# Patient Record
Sex: FEMALE | Race: WHITE | URBAN - METROPOLITAN AREA
[De-identification: names, ages, dates, MRNs, and addresses within clinical notes are randomized per-mention and may not be internally consistent; named-entity substitution may affect disease eponyms.]

---

## 2018-12-24 ENCOUNTER — HOSPITAL ENCOUNTER (OUTPATIENT)
Age: 38
Discharge: HOME | End: 2018-12-24
Payer: COMMERCIAL

## 2018-12-24 VITALS
TEMPERATURE: 98 F | RESPIRATION RATE: 14 BRPM | HEART RATE: 82 BPM | DIASTOLIC BLOOD PRESSURE: 73 MMHG | OXYGEN SATURATION: 97 % | SYSTOLIC BLOOD PRESSURE: 110 MMHG

## 2018-12-24 DIAGNOSIS — K52.9 GASTROENTERITIS: Primary | ICD-10-CM

## 2018-12-24 DIAGNOSIS — E87.6 HYPOKALEMIA: ICD-10-CM

## 2018-12-24 DIAGNOSIS — S39.012A BACK STRAIN, INITIAL ENCOUNTER: ICD-10-CM

## 2018-12-24 DIAGNOSIS — E86.0 DEHYDRATION: ICD-10-CM

## 2018-12-24 PROCEDURE — 99204 OFFICE O/P NEW MOD 45 MIN: CPT

## 2018-12-24 PROCEDURE — 96361 HYDRATE IV INFUSION ADD-ON: CPT

## 2018-12-24 PROCEDURE — 80047 BASIC METABLC PNL IONIZED CA: CPT

## 2018-12-24 PROCEDURE — 96374 THER/PROPH/DIAG INJ IV PUSH: CPT

## 2018-12-24 PROCEDURE — 99205 OFFICE O/P NEW HI 60 MIN: CPT

## 2018-12-24 PROCEDURE — 85025 COMPLETE CBC W/AUTO DIFF WBC: CPT | Performed by: PHYSICIAN ASSISTANT

## 2018-12-24 RX ORDER — METHOCARBAMOL 500 MG/1
500 TABLET, FILM COATED ORAL 3 TIMES DAILY
Qty: 15 TABLET | Refills: 0 | Status: SHIPPED | OUTPATIENT
Start: 2018-12-24

## 2018-12-24 RX ORDER — FEXOFENADINE HCL 180 MG/1
180 TABLET ORAL DAILY
COMMUNITY

## 2018-12-24 RX ORDER — ONDANSETRON 4 MG/1
4 TABLET, ORALLY DISINTEGRATING ORAL EVERY 4 HOURS PRN
Qty: 10 TABLET | Refills: 0 | Status: SHIPPED | OUTPATIENT
Start: 2018-12-24 | End: 2018-12-31

## 2018-12-24 RX ORDER — SODIUM CHLORIDE 9 MG/ML
1000 INJECTION, SOLUTION INTRAVENOUS ONCE
Status: COMPLETED | OUTPATIENT
Start: 2018-12-24 | End: 2018-12-24

## 2018-12-24 RX ORDER — ONDANSETRON 2 MG/ML
4 INJECTION INTRAMUSCULAR; INTRAVENOUS ONCE
Status: COMPLETED | OUTPATIENT
Start: 2018-12-24 | End: 2018-12-24

## 2018-12-24 RX ORDER — ESCITALOPRAM OXALATE 10 MG/1
30 TABLET ORAL DAILY
COMMUNITY

## 2018-12-24 RX ORDER — RANITIDINE 150 MG/1
150 CAPSULE ORAL 2 TIMES DAILY
COMMUNITY

## 2018-12-24 RX ORDER — SODIUM CHLORIDE 9 MG/ML
INJECTION, SOLUTION INTRAVENOUS ONCE
Status: COMPLETED | OUTPATIENT
Start: 2018-12-24 | End: 2018-12-24

## 2018-12-24 RX ORDER — POTASSIUM CHLORIDE 20 MEQ/1
40 TABLET, EXTENDED RELEASE ORAL ONCE
Status: DISCONTINUED | OUTPATIENT
Start: 2018-12-24 | End: 2018-12-24

## 2018-12-24 NOTE — ED INITIAL ASSESSMENT (HPI)
Pt ate raw cookie dough yesterday and began with nausea and vomiting , and now has diarrhea.   Feels dehydrated

## 2018-12-24 NOTE — ED PROVIDER NOTES
Patient Seen in: [facility]    History   Patient presents with:  Nausea/Vomiting/Diarrhea (gastrointestinal)    Stated Complaint: VOMITING    HPI    17-year-old female who comes in today stating that she ate raw cookie dough yes discharge; no sinus tenderness  Throat:  Lips, tongue, and mucosa are dry, pink, and intact; posterior pharynx without exudate or erythema. No trismus or stridor, uvula midline.    Neck:  Supple; symmetrical, trachea midline, no adenopathy  Lungs:  Clear to Potassium 3.3 (L) 3.6 - 5.1 mmol/L    ISTAT Chloride 98 (L) 101 - 111 mmol/L    [facility]AT Ionized Calcium 1.12 mmol/L    [facility]AT Creatinine 0.70 0.55 - 1.02 mg/dL    [facility]AT Hematocrit 37 34 - 50 %    [facility]AT Glucose 95 70 - 99 mg/dL    ISTAT Sample Type Venous may arise to return to the immediate care or ER for new, worsening or any persistent conditions. I've explained the importance of following up with her doctor- No primary care provider on file. - as instructed.   The patient verbalized understanding of th